# Patient Record
Sex: MALE | Race: WHITE
[De-identification: names, ages, dates, MRNs, and addresses within clinical notes are randomized per-mention and may not be internally consistent; named-entity substitution may affect disease eponyms.]

---

## 2017-10-14 ENCOUNTER — HOSPITAL ENCOUNTER (EMERGENCY)
Dept: HOSPITAL 14 - H.ER | Age: 62
Discharge: HOME | End: 2017-10-14
Payer: COMMERCIAL

## 2017-10-14 VITALS — BODY MASS INDEX: 28.3 KG/M2

## 2017-10-14 DIAGNOSIS — I10: ICD-10-CM

## 2017-10-14 DIAGNOSIS — H60.93: Primary | ICD-10-CM

## 2017-10-14 NOTE — ED PDOC
HPI: General Adult


Time Seen by Provider: 10/14/17 16:01


Chief Complaint (Nursing): ENT Problem


History Per: Patient


History/Exam Limitations: no limitations


Onset/Duration Of Symptoms: Days


Current Symptoms Are (Timing): Still Present


Severity: Moderate


Additional History Per: Patient


Additional Complaint(s): 





63 y/o male with history of cerumen impaction who complains of bilateral ear 

fullness that began this morning. He denies pain, fever, or other complaints. 

Patient suspected cerumen impaction and did dry Debrox without improvement. No 

other complaints at this time. 





Past Medical History





- Medical History


PMH: HTN





- Family History


Family History: States: No Known Family Hx





- Immunization History


Hx Tetanus Toxoid Vaccination: No


Hx Influenza Vaccination: No


Hx Pneumococcal Vaccination: No





- Home Medications


Home Medications: 


 Ambulatory Orders











 Medication  Instructions  Recorded


 


Alprazolam [Xanax] 0.5 mg PO BID 06/15/16


 


Amoxicillin [Amoxil 500 mg Cap] 500 mg PO Q8 #30 cap 10/14/17


 


Ofloxacin Otic 0.3% [Floxin 0.3% 10 drop AS DAILY #1 bottle 10/14/17





Otic Soln]  














- Allergies


Allergies/Adverse Reactions: 


 Allergies











Allergy/AdvReac Type Severity Reaction Status Date / Time


 


No Known Allergies Allergy   Verified 06/15/16 14:10














Review of Systems


Constitutional: Negative for: Fever


ENT: Positive for: Other (Ear Fullness).  Negative for: Ear Pain





Physical Exam





- Physical Exam


Appears: Positive for: Well, No Acute Distress


Skin: Positive for: Normal Color, Warm, Dry


ENT: Positive for: TM Is/Are (right, normal. Left is erythematous and bulging. 

Left canal is also erythematous and mildly edematous.).  Negative for: Nasal 

Congestion, Pharyngeal Erythema, Tonsillar Exudate, Tonsillar Swelling





Medical Decision Making


Medical Decision Making: 





Discussion: Left canal is edematous and erythematous. Left Tm is partially 

visualized and is bulging and erythematous. Patient given prescription for 

Ofloxacin and Amoxicillin. Recommended that he follow up with ENT and his PMD. 

All questions answered. Patient discharged in stable condition. 





--------------------------------------------------------------------------------

-----------------~


Scribe Attestation:


Documented by~Peggy García, acting as a scribe for INDIRA Herndon.





Provider Scribe Attestation:


All medical record entries made by the Scribe were at my direction and 

personally dictated by me. I have reviewed the chart and agree that the record 

accurately reflects my personal performance of the history, physical exam, 

medical decision making, and the department course for this patient. I have 

also personally directed, reviewed, and agree with the discharge instructions 

and disposition.





Disposition





- Clinical Impression


Clinical Impression: 


 Otitis media, Otitis externa








- Patient ED Disposition


Is Patient to be Admitted: No


Counseled Patient/Family Regarding: Diagnosis, Need For Followup





- Disposition


Referrals: 


[facility] [Outside]


[facility] [Outside]


Behin,Babak, MD [Staff Provider] - 


Disposition: Routine/Home


Disposition Time: 16:20


Condition: STABLE


Prescriptions: 


Amoxicillin [Amoxil 500 mg Cap] 500 mg PO Q8 #30 cap


Ofloxacin Otic 0.3% [Floxin 0.3% Otic Soln] 10 drop AS DAILY #1 bottle


Instructions:  Otitis Externa (ED), Otitis Media (ED)


Forms:  [facility] (English)


Print Language: ENGLISH